# Patient Record
Sex: FEMALE | Race: AMERICAN INDIAN OR ALASKA NATIVE
[De-identification: names, ages, dates, MRNs, and addresses within clinical notes are randomized per-mention and may not be internally consistent; named-entity substitution may affect disease eponyms.]

---

## 2019-01-21 ENCOUNTER — HOSPITAL ENCOUNTER (INPATIENT)
Dept: HOSPITAL 5 - TRG | Age: 24
LOS: 2 days | Discharge: HOME | End: 2019-01-23
Attending: OBSTETRICS & GYNECOLOGY | Admitting: OBSTETRICS & GYNECOLOGY
Payer: MEDICAID

## 2019-01-21 DIAGNOSIS — Z23: ICD-10-CM

## 2019-01-21 DIAGNOSIS — Z3A.36: ICD-10-CM

## 2019-01-21 LAB
HCT VFR BLD CALC: 29.6 % (ref 30.3–42.9)
HGB BLD-MCNC: 9.9 GM/DL (ref 10.1–14.3)
MCHC RBC AUTO-ENTMCNC: 33 % (ref 30–34)
MCV RBC AUTO: 89 FL (ref 79–97)
PLATELET # BLD: 320 K/MM3 (ref 140–440)
RBC # BLD AUTO: 3.31 M/MM3 (ref 3.65–5.03)

## 2019-01-21 PROCEDURE — A6250 SKIN SEAL PROTECT MOISTURIZR: HCPCS

## 2019-01-21 PROCEDURE — 3E0R3BZ INTRODUCTION OF ANESTHETIC AGENT INTO SPINAL CANAL, PERCUTANEOUS APPROACH: ICD-10-PCS | Performed by: OBSTETRICS & GYNECOLOGY

## 2019-01-21 PROCEDURE — 85014 HEMATOCRIT: CPT

## 2019-01-21 PROCEDURE — 86850 RBC ANTIBODY SCREEN: CPT

## 2019-01-21 PROCEDURE — 88307 TISSUE EXAM BY PATHOLOGIST: CPT

## 2019-01-21 PROCEDURE — 36415 COLL VENOUS BLD VENIPUNCTURE: CPT

## 2019-01-21 PROCEDURE — 86900 BLOOD TYPING SEROLOGIC ABO: CPT

## 2019-01-21 PROCEDURE — 76819 FETAL BIOPHYS PROFIL W/O NST: CPT

## 2019-01-21 PROCEDURE — 85027 COMPLETE CBC AUTOMATED: CPT

## 2019-01-21 PROCEDURE — 76815 OB US LIMITED FETUS(S): CPT

## 2019-01-21 PROCEDURE — 00HU33Z INSERTION OF INFUSION DEVICE INTO SPINAL CANAL, PERCUTANEOUS APPROACH: ICD-10-PCS | Performed by: OBSTETRICS & GYNECOLOGY

## 2019-01-21 PROCEDURE — 86592 SYPHILIS TEST NON-TREP QUAL: CPT

## 2019-01-21 PROCEDURE — 86901 BLOOD TYPING SEROLOGIC RH(D): CPT

## 2019-01-21 PROCEDURE — 3E0234Z INTRODUCTION OF SERUM, TOXOID AND VACCINE INTO MUSCLE, PERCUTANEOUS APPROACH: ICD-10-PCS | Performed by: OBSTETRICS & GYNECOLOGY

## 2019-01-21 PROCEDURE — 85018 HEMOGLOBIN: CPT

## 2019-01-21 RX ADMIN — OXYTOCIN SCH MLS/HR: 10 INJECTION, SOLUTION INTRAMUSCULAR; INTRAVENOUS at 07:46

## 2019-01-21 RX ADMIN — AMPICILLIN SODIUM SCH MLS/HR: 1 INJECTION, POWDER, FOR SOLUTION INTRAMUSCULAR; INTRAVENOUS at 14:00

## 2019-01-21 RX ADMIN — AMPICILLIN SODIUM SCH MLS/HR: 1 INJECTION, POWDER, FOR SOLUTION INTRAMUSCULAR; INTRAVENOUS at 07:49

## 2019-01-21 RX ADMIN — OXYTOCIN SCH MLS/HR: 10 INJECTION, SOLUTION INTRAMUSCULAR; INTRAVENOUS at 05:19

## 2019-01-21 RX ADMIN — SODIUM CHLORIDE, SODIUM LACTATE, POTASSIUM CHLORIDE, AND CALCIUM CHLORIDE SCH MLS/HR: .6; .31; .03; .02 INJECTION, SOLUTION INTRAVENOUS at 03:26

## 2019-01-21 RX ADMIN — IBUPROFEN SCH MG: 600 TABLET, FILM COATED ORAL at 18:37

## 2019-01-21 RX ADMIN — IBUPROFEN SCH MG: 600 TABLET, FILM COATED ORAL at 23:53

## 2019-01-21 RX ADMIN — SODIUM CHLORIDE, SODIUM LACTATE, POTASSIUM CHLORIDE, AND CALCIUM CHLORIDE SCH MLS/HR: .6; .31; .03; .02 INJECTION, SOLUTION INTRAVENOUS at 10:51

## 2019-01-21 NOTE — PROCEDURE NOTE
OB Delivery Note





- Delivery


Date of Delivery: 19


Assistant: MARILYN AGUILAR


Estimated blood loss: 300cc





- Vaginal


Delivery presentation: vertex


Delivery position: OA


Intrapartum events:  labor-<37 weeks, PROM->1hr before delivery, 

mult.variable deceleratio (CAN X 1 )


Delivery induction: none


Delivery augmentation: pitocin


Delivery monitor: internal FHT, internal uterine


Route of delivery: 


Delivery placenta: spontaneous


Delivery cord: nuchal cord, 3 umbilical vessels


Episiotomy: none


Delivery laceration: none


Anesthesia: epidural


Delivery comments: 


 live born female over intact perineum CAN X 1 reduced Baby to mom's abdomen

skin to skin  Placenta and membrane delivered complete and intact 3 vessel cord 

Pit IVFs Placenta to pathology Apgar 8/9, , Wgt 5-3 Mom and baby remain 

LDR stable.








- Infant


  ** A


Apgar at 1 minute: 8


Apgar at 5 minutes: 9


Infant Gender: Female (wgt 5-3)

## 2019-01-21 NOTE — ULTRASOUND REPORT
FINAL REPORT



EXAM:  US OB FETAL BPP WO NON-STRESS



HISTORY:  fetal well being 



TECHNIQUE:  A limited obstetrical sonogram was obtained for evaluation of the biophysical profile.



FINDINGS:  

For fetal movements, a score of 2 out of 2 was obtained.



For fetal breathing movements, a score of 2 out of 2 was obtained.



For fetal posture in tone, a score of 2 out of 2 was obtained.



For qualitative amniotic fluid volume, a score of 2 out of 2 was obtained.



The fetal heart rate is 132 BPM



IMPRESSION:  

Biophysical profile score of 8 out of 8

## 2019-01-21 NOTE — ANESTHESIA CONSULTATION
Anesthesia Consult and Med Hx


Date of service: 01/21/19





- Airway


Anesthetic Teeth Evaluation: Good


ROM Head & Neck: Adequate


Mental/Hyoid Distance: Adequate


Mallampati Class: Class II


Intubation Access Assessment: Good





- Pulmonary Exam


CTA: Yes





- Cardiac Exam


Cardiac Exam: No Murmur





- Pre-Operative Health Status


ASA Pre-Surgery Classification: ASA2


Proposed Anesthetic Plan: Epidural





- Pulmonary


Hx Smoking: No


Hx Asthma: No


Hx Respiratory Symptoms: No


SOB: No


COPD: No


Home Oxygen Therapy: No


Hx Pneumonia: No


Hx Sleep Apnea: No





- Cardiovascular System


Hx Hypertension: No


Hx Coronary Artery Disease: No


Hx Heart Attack/AMI: No


Hx Angina: No


Hx Percutaneous Transluminal Coronary Angioplasty (PTCA): No


Hx Cardia Arrhythmia: No


Hx Pacemaker: No


Hx Internal Defibrillator: No


Hx Valvular Heart Disease: No


Hx Heart Murmur: No


Hx Peripheral Vascular Disease: No





- Central Nervous System


Hx Neuromuscular Disorder: No


Hx Seizures: No


CVA: No


Hx Back Pain: No


Hx Psychiatric Problems: No





- Gastrointestinal


Hx Ulcer: No


Hx Gastroesophageal Reflux Disease: No





- Endocrine


Hx Renal Disease: No


Hx End Stage Renal Disease: No


Hx Cirrhosis: No


Hx Liver Disease: No


Hx Insulin Dependent Diabetes: No


Hx Non-Insulin Dependent Diabetes: No


Hx Thyroid Disease: No


Hx Hypothyroidism: No


Hx Hyperthyroidism: No





- Hematic


Hx Anemia: Yes (Iron, pregnancy induced)


Hx Sickle Cell Disease: No





- Other Systems


Hx Alcohol Use: No


Hx Substance Use: No


Hx Cancer: No


Hx Obesity: No

## 2019-01-21 NOTE — HISTORY AND PHYSICAL REPORT
Addendum entered and electronically signed by NICK ALEGRE CNM  19 

08:50: 











Original Note:








<NICK ALEGRE - Last Filed: 19 02:51>





History of Present Illness


Date of examination: 19


Date of admission: 


19 02:33





Chief complaint: 


SROM, decreased fetal movement





History of present illness: 





Past Pregnancy History 


   :      3


   Term Births:      0


   Premature Births:   0


   Living Children:   0


   Para:         0


   Mult. Births:      0


   Prev :   0


   Aborta:      1


   Elect. Ab:      1


   Spont. Ab:      1





Pregnancy # 1


   Delivery date:     2016


   Weeks Gestation:   13wks


   Delivery type:     SAB








Past Medical History:


   Reviewed history from 2016 and no changes required:


      Negative Past Medical History





Past Surgical History:


   Reviewed history from 2016 and no changes required:


      T&A 





Past Medical History 


Abnormal PAP: negative





Family Hx: Mother - htn


Maternal aunt - breast ca





Social Hx: Patient is single


no smoking/etoh/drugs





Smoking History:


Patient has never smoked.


hx thc but stopped


no etoh








Infection History 


Hx of STD: chlamydia


HIV Risk Eval: no


Hepatitis B Risk Eval: low risk


Personal hx. of genital herpes: no


Partner hx. of genital herpes: no


Rash, Viral, or Febrile illness since last LMP? no


Varicella/Chicken Pox Status: Previous Disease





Genetic History 


 Congenital Heart Defect:


    Mom: no  Dad: no


Canavan Disease:


    Mom: no  Dad: no


Thalassemia


    Mom: no  Dad: no


Neural Tube Defect


    Mom: no  Dad: no


Down's Syndrome


    Mom: no  Dad: no


Ayaz-Sachs


    Mom: no  Dad: no


Sickle Cell Disease/Trait


    Mom: no  Dad: no


Hemophilia


    Mom: no  Dad: no


Muscular Dystrophy


    Mom: no  Dad: no


Cystic Fibrosis


    Mom: no  Dad: no


[place] Chorea


    Mom: no  Dad: no


Mental Retardation


    Mom: no  Dad: no


Fragile X


    Mom: no  Dad: no


Other Genetic/Chromosomal Disorder


    Mom: no  Dad: no


Child w/other birth defect


    Mom: no  Dad: no





Enviromental Exposures 


 Enviromental Exposures Reviewed


Xray Exposure: no


Medication, drug, or alcohol use since LMP: no


Chemical/Other Exposure: no


Exposure to Cat Liter: no


Hx of Parvovirus (Fifth Disease): no


Occupational Exposure to Children: none


Active Medications (reviewed today):


None





Current Allergies (reviewed today):


No known allergies





Past History


Past Medical History: other (see hpi)


Past Surgical History: other (see hpi)


GYN History: other (see hpi)


Family/Genetic History: other (see hpi)


Social history: other (see hpi)





- Obstetrical History


: 3





Medications and Allergies


                                    Allergies











Allergy/AdvReac Type Severity Reaction Status Date / Time


 


No Known Allergies Allergy   Verified 19 01:25











Active Meds: 


Active Medications





Ephedrine Sulfate (Ephedrine Sulfate)  10 mg IV Q2M PRN


   PRN Reason: Hypotension


Fentanyl (Sublimaze)  100 mcg IV Q2H PRN


   PRN Reason: Labor Pain


Ampicillin Sodium (Polycillin/Ns 2 Gm/100 Ml)  2 gm in 100 mls @ 100 mls/hr IV 

ONCE ONE; Protocol


   Stop: 19 03:34


Ampicillin Sodium (Ampicillin/Ns 1 Gm/50 Ml)  1 gm in 50 mls @ 100 mls/hr IV 

Q4HR ANITA; Protocol


Lactated Ringer's (Lactated Ringers)  1,000 mls @ 125 mls/hr IV AS DIRECT ANITA


Oxytocin/Sodium Chloride (Pitocin/Ns 20 Unit/1000ml Drip)  20 units in 1,000 mls

@ 125 mls/hr IV AS DIRECT ANITA


Oxytocin/Sodium Chloride (Pitocin/Ns 30 Unit/500ml)  30 units in 500 mls @ 2 

mls/hr IV TITR ANITA; Protocol


Mineral Oil (Mineral Oil)  30 ml PO QHS PRN


   PRN Reason: Constipation


Terbutaline Sulfate (Brethine)  0.25 mg SUB-Q ONCE PRN


   PRN Reason: Hyperstimulation/Hypertonicity


Terbutaline Sulfate (Brethine)  0.25 mg IVP ONCE PRN


   PRN Reason: Hyperstimulation/Hypertonicity











- Vital Signs


Vital signs: 


                                   Vital Signs











Pulse BP


 


 82   121/88 


 


 19 01:27  19 01:27








                                        











Temp Pulse Resp BP Pulse Ox


 


 98.2 F   83   18   138/84   100 


 


 19 01:31  19 01:57  19 01:31  19 01:57  19 01:38














Results


All other labs normal.





Ultrasound: image reviewed (BPP . KENYA 11.5cm, Cephalic)





Assessment and Plan


23 y.o. IUP  36w 0d presents to triage with c/o SROM and decreased fetal 

movement. Reports large gush of clear fluid at 0015 but is unsure if she has 

felt fetal movement since then. BPP and KENYA upon arrival to triage are 8/8 and 

11.5cm, fetus is cephalic. SVE is 1/60/-2. Occasional contractions noted on 

TOCO. Category 1 tracing. GBS collected at last office visit but results are not

available for review yet. Routine admission orders placed. Ampicillin per 

protocol for unknown GBS status. Will begin pitocin per protocol once first dose

of abx has infused. Plan of care reviewed with Dr. Calderón who agrees to pro

ceed. Will continue to monitor and anticipate vaginal delivery. 








- Patient Problems


(1) 36 weeks gestation of pregnancy


Current Visit: Yes   Status: Acute   





(2) SROM (spontaneous rupture of membranes)


Current Visit: Yes   Status: Acute   





(3)  premature rupture of membranes


Current Visit: Yes   Status: Acute   





<MARILYN AGUILAR - Last Filed: 19 05:28>





History of Present Illness


Date of admission: 


19 02:33








Medications and Allergies


Active Meds: 


Active Medications





Ephedrine Sulfate (Ephedrine Sulfate)  10 mg IV Q2M PRN


   PRN Reason: Hypotension


Fentanyl (Sublimaze)  100 mcg IV Q2H PRN


   PRN Reason: Labor Pain


Ampicillin Sodium (Ampicillin/Ns 1 Gm/50 Ml)  1 gm in 50 mls @ 100 mls/hr IV 

Q4HR ANITA; Protocol


Lactated Ringer's (Lactated Ringers)  1,000 mls @ 125 mls/hr IV AS DIRECT ANITA


   Last Admin: 19 03:26 Dose:  125 mls/hr


   Documented by: 


Oxytocin/Sodium Chloride (Pitocin/Ns 20 Unit/1000ml Drip)  20 units in 1,000 mls

@ 125 mls/hr IV AS DIRECT ANITA


Oxytocin/Sodium Chloride (Pitocin/Ns 30 Unit/500ml)  30 units in 500 mls @ 2 

mls/hr IV TITR ANITA; Protocol


Mineral Oil (Mineral Oil)  30 ml PO QHS PRN


   PRN Reason: Constipation


Terbutaline Sulfate (Brethine)  0.25 mg SUB-Q ONCE PRN


   PRN Reason: Hyperstimulation/Hypertonicity


Terbutaline Sulfate (Brethine)  0.25 mg IVP ONCE PRN


   PRN Reason: Hyperstimulation/Hypertonicity











- Vital Signs


Vital signs: 


                                   Vital Signs











Pulse BP


 


 82   121/88 


 


 19 01:27  19 01:27








                                        











Temp Pulse Resp BP Pulse Ox


 


 98.2 F   97 H  18   119/68   100 


 


 19 01:31  19 02:47  19 02:47  19 02:47  19 01:38














- Physical Exam


Breasts: Positive: deferred


Cardiovascular: Regular rate, Normal S1, Normal S2


Lungs: Positive: Normal air movement


Abdomen: Positive: normal appearance, soft, normal bowel sounds.  Negative: 

distention, tenderness


Genitourinary (Female): Positive: normal external genitalia


Vulva: both: normal


Vagina: Positive: normal moisture.  Negative: discharge


Cervix: Negative: lesion, discharge


Uterus: Positive: normal size, normal contour


Adnexa: both: normal


Anus/Rectum: Positive: normal perianal skin, heme negative.  Negative: rectal 

mass, hemorrhoids


Extremities: 


Deep Tendon Reflex Grade: Normal +2





- Obstetrical


FHR: category 1


Uterine Contraction Monitor Mode: External


Uterine Contraction Pattern: Irregular


Uterine Tone Measurement Phase: Resting


Uterine Contraction Intensity: Mild





Results


Result Diagrams: 


                                 19 03:00





                              Abnormal lab results











  19 Range/Units





  03:00 


 


RBC  3.31 L  (3.65-5.03)  M/mm3


 


Hgb  9.9 L  (10.1-14.3)  gm/dl


 


Hct  29.6 L  (30.3-42.9)  %








All other labs normal.








GBS done but not resulted.








HBsAg Screen              Negative                    Negative         *1


  RPR                       Non Reactive                Non Reactive     *2


 Rubella Antibodies, IgG


                            2.49 index                  Immune >0.99     *3


                                    Non-immune       <0.90


                                Equivocal  0.90 - 0.99


                                Immune           >0.99


   


  ABO Grouping              B                                            *4


  Rh Factor                 Positive                                     *5


    Please note: Prior records for this patient's ABO / Rh type are not


available for additional verification.


   


  Antibody Screen           Negative                    Negative         *6


  WBC                       9.6 x10E3/uL                3.4-10.8         *7


  RBC                  [L]  3.38 x10E6/uL               3.77-5.28        *8


  Hemoglobin           [L]  10.1 g/dL                   11.1-15.9        *9


  Hematocrit           [L]  30.9 %                      34.0-46.6        *10


  MCV                       91 fL                       79-97            *11


  MCH                       29.9 pg                     26.6-33.0        *12


  MCHC                      32.7 g/dL                   31.5-35.7        *13


  RDW                  [H]  15.7 %                      12.3-15.4        *14


  Platelets                 232 x10E3/uL                150-379          *15


  Neutrophils               78 %                        Not Estab.       *16


  Lymphs                    14 %                        Not Estab.       *17


  Monocytes                 6 %                         Not Estab.       *18


  Eos                       1 %                         Not Estab.       *19


  Basos                     0 %                         Not Estab.       *20


! Immature Cells            <No Reported Value>                          *21


 Neutrophils (Absolute)


                       [H]  7.5 x10E3/uL                1.4-7.0          *22


  Lymphs (Absolute)         1.3 x10E3/uL                0.7-3.1          *23


  Monocytes(Absolute)       0.6 x10E3/uL                0.1-0.9          *24


  Eos (Absolute)            0.1 x10E3/uL                0.0-0.4          *25


  Baso (Absolute)           0.0 x10E3/uL                0.0-0.2          *26


! Immature Granulocytes


                            1 %                         Not Estab.       *27


! Immature Grans (Abs)      0.1 x10E3/uL                0.0-0.1          *28


! NRBC                      <No Reported Value>                          *29


  Hematology Comments:      <No Reported Value>                          *30





Tests: (2) AFP Tetra (730492)


! Results                   Report                                       *31


! Test Results:             *Screen Negative*                            *32


!





Tests: (3) Cystic Fibrosis Profile (322121)


! CF, Screen                Comment:                                     *55


    RESULTS: Negative for 32 mutations analyzed








Tests: (4) HB Solu + Rflx [facility] (882269)


 Hemoglobin (Hgb) Solubility


                            Negative                    Negative         *57





Tests: (5) Panel 014316 ([id number])


 HIV Screen 4th Generation wRfx


                            Non Reactive                Non Reactive     *58





Tests: (6) HCV Ab w/Rflx to Verification (427492)


! HCV Ab                    <0.1 s/co ratio             0.0-0.9          *59





Tests: (7) Comment: ([id number])


! Comment:                  SPRCS                                        *60


    Non reactive HCV antibody screen is consistent with no HCV infection,


unless recent infection is suspected or other evidence exists to


indicate HCV infection.

## 2019-01-21 NOTE — PROGRESS NOTE
Assessment and Plan


Anesthesia spoke with pt. Will continue POC Anticipate delivery








Subjective





- Subjective


Date of service: 01/21/19 (ISE replaced)


Patient reports: fetal movement normal, contractions





Objective





- Vital Signs


Vital Signs: 


                               Vital Signs - 12hr











  01/21/19 01/21/19 01/21/19





  01:57 02:44 02:47


 


Temperature   


 


Pulse Rate 83 97 H 97 H


 


Respiratory   18





Rate   


 


Blood Pressure 138/84 119/68 


 


Blood Pressure   119/68





[Left]   


 


O2 Sat by Pulse   





Oximetry   














  01/21/19 01/21/19 01/21/19





  07:42 07:43 12:31


 


Temperature 98.1 F  


 


Pulse Rate 84 84 98 H


 


Respiratory 14  





Rate   


 


Blood Pressure  89/53 116/72


 


Blood Pressure 89/53  





[Left]   


 


O2 Sat by Pulse   





Oximetry   














  01/21/19 01/21/19 01/21/19





  12:32 12:36 12:37


 


Temperature   


 


Pulse Rate 119 H 86 90


 


Respiratory   





Rate   


 


Blood Pressure  117/68 


 


Blood Pressure   





[Left]   


 


O2 Sat by Pulse 74 L  93





Oximetry   














  01/21/19 01/21/19 01/21/19





  12:39 12:47 12:51


 


Temperature   


 


Pulse Rate 95 H 109 H 107 H


 


Respiratory   





Rate   


 


Blood Pressure 120/77 131/77 118/65


 


Blood Pressure   





[Left]   


 


O2 Sat by Pulse   





Oximetry   














  01/21/19 01/21/19 01/21/19





  12:55 12:59 13:03


 


Temperature   


 


Pulse Rate 115 H 94 H 110 H


 


Respiratory   





Rate   


 


Blood Pressure 118/68 117/64 113/65


 


Blood Pressure   





[Left]   


 


O2 Sat by Pulse  100 





Oximetry   














  01/21/19 01/21/19 01/21/19





  13:04 13:07 13:09


 


Temperature   


 


Pulse Rate 101 H 100 H 105 H


 


Respiratory   





Rate   


 


Blood Pressure  120/69 


 


Blood Pressure   





[Left]   


 


O2 Sat by Pulse 100  100





Oximetry   














  01/21/19 01/21/19 01/21/19





  13:11 13:14 13:15


 


Temperature   


 


Pulse Rate 94 H 102 H 105 H


 


Respiratory   





Rate   


 


Blood Pressure 119/71  118/66


 


Blood Pressure   





[Left]   


 


O2 Sat by Pulse  100 





Oximetry   














  01/21/19 01/21/19 01/21/19





  13:19 13:23 13:24


 


Temperature   


 


Pulse Rate 109 H 108 H 109 H


 


Respiratory   





Rate   


 


Blood Pressure 121/73 125/74 


 


Blood Pressure   





[Left]   


 


O2 Sat by Pulse 100  100





Oximetry   














  01/21/19 01/21/19 01/21/19





  13:27 13:29 13:31


 


Temperature   


 


Pulse Rate 116 H 109 H 108 H


 


Respiratory   





Rate   


 


Blood Pressure 111/71  122/86


 


Blood Pressure   





[Left]   


 


O2 Sat by Pulse  100 





Oximetry   














  01/21/19 01/21/19 01/21/19





  13:34 13:35 13:39


 


Temperature   


 


Pulse Rate 106 H 116 H 112 H


 


Respiratory   





Rate   


 


Blood Pressure  115/73 120/75


 


Blood Pressure   





[Left]   


 


O2 Sat by Pulse 100  100





Oximetry   














  01/21/19 01/21/19





  13:43 13:44


 


Temperature  


 


Pulse Rate 118 H 113 H


 


Respiratory  





Rate  


 


Blood Pressure 117/74 


 


Blood Pressure  





[Left]  


 


O2 Sat by Pulse  100





Oximetry  














- Exam


Breasts: deferred


Cardiovascular: Regular rate


Lungs: Normal air movement


Abdomen: Present: normal appearance, soft.  Absent: distention, tenderness


Uterus: Present: normal


FHR: auscultation normal, category 2 (variables)


Uterine Contraction Monitor Mode: Internal


Cervical Dilatation: 9 (ISE replaced)


Cervical Effacement Percentage: 100


Fetal station: 0


Uterine Contraction Pattern: Regular


Uterine Tone Measurement Phase: Resting


Uterine Contraction Intensity: Moderate


Extremities: normal


Deep Tendon Reflex Grade: Normal +2





- Labs


Labs: 


                                  Abnormal Labs











  01/21/19





  03:00


 


RBC  3.31 L


 


Hgb  9.9 L


 


Hct  29.6 L








                         Laboratory Results - last 24 hr











  01/21/19 01/21/19





  03:00 03:00


 


WBC  9.0 


 


RBC  3.31 L 


 


Hgb  9.9 L 


 


Hct  29.6 L 


 


MCV  89 


 


MCH  30 


 


MCHC  33 


 


RDW  14.0 


 


Plt Count  320 


 


Blood Type   B POSITIVE


 


Antibody Screen   Negative

## 2019-01-21 NOTE — PROGRESS NOTE
Assessment and Plan


Bolusing for epidural Internal monitors placed Pit decreased to 12mu Re-eval 

after epidural is placed.








Subjective





- Subjective


Date of service: 01/21/19 (pt crying with pain despite IV pain meds)


Patient reports: fetal movement normal, contractions





Objective





- Vital Signs


Vital Signs: 


                               Vital Signs - 12hr











  01/21/19 01/21/19 01/21/19





  01:27 01:31 01:38


 


Temperature  98.2 F 


 


Pulse Rate 82 82 95 H


 


Respiratory  18 





Rate   


 


Blood Pressure 121/88  


 


Blood Pressure  121/88 





[Left]   


 


O2 Sat by Pulse   100





Oximetry   














  01/21/19 01/21/19 01/21/19





  01:57 02:44 02:47


 


Temperature   


 


Pulse Rate 83 97 H 97 H


 


Respiratory   18





Rate   


 


Blood Pressure 138/84 119/68 


 


Blood Pressure   119/68





[Left]   


 


O2 Sat by Pulse   





Oximetry   














  01/21/19 01/21/19





  07:42 07:43


 


Temperature 98.1 F 


 


Pulse Rate 84 84


 


Respiratory 14 





Rate  


 


Blood Pressure  89/53


 


Blood Pressure 89/53 





[Left]  


 


O2 Sat by Pulse  





Oximetry  














- Exam


Breasts: deferred


Cardiovascular: Regular rate


Lungs: Normal air movement


Abdomen: Present: normal appearance, soft.  Absent: distention, tenderness


Uterus: Present: normal


FHR: auscultation normal, category 1


Uterine Contraction Monitor Mode: Internal


Cervical Dilatation: 4 (ISE/IUPC placed)


Cervical Effacement Percentage: 100 (pit decreased to 12mu)


Fetal station: 0


Uterine Contraction Pattern: Regular


Uterine Tone Measurement Phase: Resting


Uterine Contraction Intensity: Moderate


Extremities: normal


Deep Tendon Reflex Grade: Normal +2





- Labs


Labs: 


                                  Abnormal Labs











  01/21/19





  03:00


 


RBC  3.31 L


 


Hgb  9.9 L


 


Hct  29.6 L








                         Laboratory Results - last 24 hr











  01/21/19 01/21/19





  03:00 03:00


 


WBC  9.0 


 


RBC  3.31 L 


 


Hgb  9.9 L 


 


Hct  29.6 L 


 


MCV  89 


 


MCH  30 


 


MCHC  33 


 


RDW  14.0 


 


Plt Count  320 


 


Blood Type   B POSITIVE


 


Antibody Screen   Negative

## 2019-01-21 NOTE — ULTRASOUND REPORT
FINAL REPORT



EXAM:  US OB LIMITED



HISTORY:  fetal well being 



TECHNIQUE:  A limited OB sonogram was obtained for evaluation of amniotic fluid index.



FINDINGS:  

The fetus is in cephalic presentation. The fetal heart rate is 132 BPM. The placenta is fundal in pos
ition and is grade 1. The KENYA is 11.5 cm which is normal.



IMPRESSION:  

Cephalic presentation. Normal KENYA of 11.5 cm.



Fetal heart rate is 132 BPM.

## 2019-01-22 LAB
HCT VFR BLD CALC: 28.1 % (ref 30.3–42.9)
HGB BLD-MCNC: 9.3 GM/DL (ref 10.1–14.3)

## 2019-01-22 RX ADMIN — IBUPROFEN SCH: 600 TABLET, FILM COATED ORAL at 18:00

## 2019-01-22 RX ADMIN — IBUPROFEN SCH: 600 TABLET, FILM COATED ORAL at 11:50

## 2019-01-22 NOTE — DISCHARGE SUMMARY
Providers





- Providers


Date of Admission: 


19 02:33





Date of discharge: 19 (Patient desires to discharge tomorrow )


Attending physician: 


MANPREET SHAW





Primary care physician: 


MANPREET SHAW








Hospitalization


Reason for admission: SROM, Labor


Condition: Good


Pertinent studies: 





post delivery H&H 9.3/28.1. Pre-existing anemia on admission. Currently taking 

iron, asymptomatic. 


Procedures: 








Hospital course: 


uncomplicated labor & delivery and postpartum course





Disposition: DC-01 TO HOME OR SELFCARE





- Discharge Diagnoses


(1) 36 weeks gestation of pregnancy


Status: Acute   





(2) SROM (spontaneous rupture of membranes)


Status: Acute   





(3)  premature rupture of membranes


Status: Acute   





Core Measure Documentation





- Palliative Care


Palliative Care/ Comfort Measures: Not Applicable





- Core Measures


Any of the following diagnoses?: none





Exam





- Constitutional


Vitals: 


                                        











Temp Pulse Resp BP Pulse Ox


 


 98.5 F   92 H  20   106/75   100 


 


 19 07:20  19 07:20  19 07:20  19 07:20  19 07:20











General appearance: Present: no acute distress, well-nourished





- EENT


Eyes: Present: PERRL


ENT: hearing intact, clear oral mucosa





- Neck


Neck: Present: supple, normal ROM





- Respiratory


Respiratory effort: normal


Respiratory: bilateral: CTA





- Cardiovascular


Rhythm: regular


Heart Sounds: Present: S1 & S2.  Absent: rub, click





- Extremities


Extremities: pulses symmetrical, No edema


Peripheral Pulses: within normal limits





- Abdominal


General gastrointestinal: Present: soft, non-tender, non-distended, normal bowel

sounds


Female genitourinary: Present: normal





- Integumentary


Integumentary: Present: clear, warm, dry





- Musculoskeletal


Musculoskeletal: gait normal, strength equal bilaterally





- Psychiatric


Psychiatric: appropriate mood/affect, intact judgment & insight





- Neurologic


Neurologic: CNII-XII intact, moves all extremities





- Additional findings


Additional findings: 





fundus firm, ML, U/1. Scant bleeding. Patient is planning to start pumping today

for infant in NICU. States she has iron rx at home. Will send with colace rx per

request.  





Plan


Activity: no restrictions


Diet: regular


Follow up with: 


MANPREET SHAW MD [Primary Care Provider] - 6 Weeks (Congratulations! 

Please call [phone] when discharged to schedule your postpartum appointment

for 6 weeks. Call with any questions or concerns. )

## 2019-01-23 VITALS — DIASTOLIC BLOOD PRESSURE: 70 MMHG | SYSTOLIC BLOOD PRESSURE: 121 MMHG

## 2019-01-23 RX ADMIN — IBUPROFEN SCH: 600 TABLET, FILM COATED ORAL at 00:32

## 2019-01-23 RX ADMIN — IBUPROFEN SCH: 600 TABLET, FILM COATED ORAL at 05:05
